# Patient Record
(demographics unavailable — no encounter records)

---

## 2025-02-17 NOTE — PLAN
[FreeTextEntry1] : Patient is a 36-year-old  2 para 2 last menstrual period 2025 Presents for annual visit,, denies any complaints Physical exam reveals a well-developed well-nourished female no apparent distress,, BMI 34 Heart regular rhythm and rate, lungs clear, breast no mass nontender no skin change no nipple discharge no adenopathy, abdomen soft nontender no organomegaly Pelvic exam shows normal female external genitalia, vagina no lesions, cervix appropriate size nontender, uterus anteverted normal size nontender, adnexa no mass nontender Pap was performed Patient does not desire contraception at this point Essentially benign GYN exam Follow-up 1 year or prior to that as needed  Kenneth was present as a chaperone for the entire assessment and examination this patient and patient's 2 children were in the room the entire time

## 2025-02-17 NOTE — HISTORY OF PRESENT ILLNESS
[FreeTextEntry1] : Patient is a 36-year-old  2 para 2 last menstrual period 2025 Patient presents from annual visit,, denies any complaint

## 2025-02-17 NOTE — PHYSICAL EXAM
[Chaperone Present] : A chaperone was present in the examining room during all aspects of the physical examination [25731] : A chaperone was present during the pelvic exam. [FreeTextEntry2] : Naobi [Appropriately responsive] : appropriately responsive [Alert] : alert [No Acute Distress] : no acute distress [Regular Rate Rhythm] : regular rate rhythm [No Lymphadenopathy] : no lymphadenopathy [No Murmurs] : no murmurs [Soft] : soft [Clear to Auscultation B/L] : clear to auscultation bilaterally [Non-tender] : non-tender [Non-distended] : non-distended [No HSM] : No HSM [No Lesions] : no lesions [No Mass] : no mass [Oriented x3] : oriented x3 [FreeTextEntry6] : No masses, nontender, no skin changes, no nipple discharge, no adenopathy. [Examination Of The Breasts] : a normal appearance [No Masses] : no breast masses were palpable [Labia Majora] : normal [Labia Minora] : normal [Normal] : normal [Tenderness] : nontender [Anteversion] : anteverted [Mass ___ cm] : no uterine mass was palpated [Uterine Adnexae] : normal

## 2025-03-14 NOTE — HISTORY OF PRESENT ILLNESS
[FreeTextEntry1] : Patient is a 36-year-old  3 para 2-0-0-2 last menstrual period 2025 Patient presents for evaluation after obtaining home positive pregnancy test

## 2025-03-14 NOTE — PLAN
[FreeTextEntry1] : Patient 36-year-old  3 para 2 last menstrual period 2025 Presents for evaluation after obtaining a home positive pregnancy test Patient had full annual visit approximately 2025 with negative findings Urine pregnancy test is positive Vaginal culture was performed for a screening and GC chlamydia Patient will be picking up over-the-counter prenatal vitamins and will be prescribed folic acid supplementation Discussed at length possible repeat  section and bilateral salpingectomy at that point for contraception and ovarian cancer reduction risk Questions answered Patient states she understands Follow-up 3 weeks for a new OB dating sonogram  Diane was present as a chaperone for the entire assessment and examination of this patient and patient's  was in the room the entire time

## 2025-03-14 NOTE — PHYSICAL EXAM
[Chaperone Present] : A chaperone was present in the examining room during all aspects of the physical examination [Labia Majora] : normal [Labia Minora] : normal [Normal] : normal [FreeTextEntry2] : Diane